# Patient Record
Sex: MALE | Race: OTHER | NOT HISPANIC OR LATINO | URBAN - METROPOLITAN AREA
[De-identification: names, ages, dates, MRNs, and addresses within clinical notes are randomized per-mention and may not be internally consistent; named-entity substitution may affect disease eponyms.]

---

## 2020-07-26 ENCOUNTER — EMERGENCY (EMERGENCY)
Facility: HOSPITAL | Age: 50
LOS: 1 days | Discharge: HOME | End: 2020-07-26
Attending: STUDENT IN AN ORGANIZED HEALTH CARE EDUCATION/TRAINING PROGRAM
Payer: SELF-PAY

## 2020-07-26 VITALS — DIASTOLIC BLOOD PRESSURE: 94 MMHG | SYSTOLIC BLOOD PRESSURE: 174 MMHG | HEART RATE: 74 BPM | RESPIRATION RATE: 18 BRPM

## 2020-07-26 VITALS
DIASTOLIC BLOOD PRESSURE: 104 MMHG | HEIGHT: 68 IN | TEMPERATURE: 98 F | WEIGHT: 214.07 LBS | RESPIRATION RATE: 18 BRPM | HEART RATE: 72 BPM | SYSTOLIC BLOOD PRESSURE: 191 MMHG | OXYGEN SATURATION: 99 %

## 2020-07-26 PROCEDURE — 73630 X-RAY EXAM OF FOOT: CPT | Mod: 26,LT

## 2020-07-26 PROCEDURE — 99284 EMERGENCY DEPT VISIT MOD MDM: CPT

## 2020-07-26 PROCEDURE — 73630 X-RAY EXAM OF FOOT: CPT

## 2020-07-26 PROCEDURE — 99283 EMERGENCY DEPT VISIT LOW MDM: CPT

## 2020-07-26 RX ORDER — ACETAMINOPHEN 500 MG
650 TABLET ORAL ONCE
Refills: 0 | Status: COMPLETED | OUTPATIENT
Start: 2020-07-26 | End: 2020-07-26

## 2020-07-26 RX ADMIN — Medication 650 MILLIGRAM(S): at 19:41

## 2020-07-26 NOTE — ED PROVIDER NOTE - PHYSICAL EXAMINATION
left ankle / foot: no gross deformity of extremity, FROM knee/ hip, no fibula head/ prox fibula tenderness, (-) tenderness to base 5th MT, (-) tenderness to posterior aspect of lateral malleolus, 2nd toe mild swollen and pain on extension, DP pulse +2, walking with slow mild limping gait

## 2020-07-26 NOTE — ED PROVIDER NOTE - CARE PROVIDER_API CALL
Everett Banks ([facility])  Surgery  [address] [place] [address]  Phone: [phone]  Fax: [phone]  Follow Up Time:

## 2020-07-26 NOTE — ED PROVIDER NOTE - PATIENT PORTAL LINK FT
You can access the FollowMyHealth Patient Portal offered by [facility] by registering at the following website: [email or web address]. By joining [facility]’s FollowMyHealth portal, you will also be able to view your health information using other applications (apps) compatible with our system.

## 2020-07-26 NOTE — ED PROVIDER NOTE - NSFOLLOWUPINSTRUCTIONS_ED_ALL_ED_FT
keep the surgical shoes/ and crutches  on as walking   do not bear weight as walking , take over the counter tylenol / and follow instruction take itr as need it for the pain   call and follow up with podiatry   you have elevated Blood pressure . please call and follow up with primary care for further evaluation and possible start you on blood pressure medication , avoid salty food   come back in ER if any fever , chills, swollen . pain ,or pain out of proportion or any new concern

## 2020-07-26 NOTE — ED PROVIDER NOTE - PRINCIPAL DIAGNOSIS
Toe pain, left Closed nondisplaced fracture of phalanx of toe of left foot, unspecified toe, initial encounter

## 2020-07-26 NOTE — ED PROVIDER NOTE - OBJECTIVE STATEMENT
51 yo male pmg of HTn , pre DM presents in ER and c/o left foot at second toes area pain s.p [place] and he got probably  hyper extended the left 2nd toe. pain on walking W.o any N/T . states he took Advil early day today for the pain . denies head trauma, loc , neck pain or back pain , chest pain , SOB , headache or dizziness or any other complaint. pt is aware of hx of BP , states he is not taking any medication for it yet
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
## 2020-07-26 NOTE — ED PROVIDER NOTE - CARE PLAN
Principal Discharge DX:	Toe pain, left Principal Discharge DX:	Closed nondisplaced fracture of phalanx of toe of left foot, unspecified toe, initial encounter
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
